# Patient Record
Sex: MALE | Race: WHITE | NOT HISPANIC OR LATINO | URBAN - METROPOLITAN AREA
[De-identification: names, ages, dates, MRNs, and addresses within clinical notes are randomized per-mention and may not be internally consistent; named-entity substitution may affect disease eponyms.]

---

## 2021-08-03 ENCOUNTER — EMERGENCY (EMERGENCY)
Facility: HOSPITAL | Age: 41
LOS: 1 days | Discharge: HOME | End: 2021-08-03
Attending: EMERGENCY MEDICINE | Admitting: EMERGENCY MEDICINE
Payer: COMMERCIAL

## 2021-08-03 VITALS
OXYGEN SATURATION: 97 % | WEIGHT: 169.98 LBS | TEMPERATURE: 98 F | RESPIRATION RATE: 16 BRPM | SYSTOLIC BLOOD PRESSURE: 127 MMHG | DIASTOLIC BLOOD PRESSURE: 87 MMHG | HEART RATE: 70 BPM | HEIGHT: 67 IN

## 2021-08-03 DIAGNOSIS — Z23 ENCOUNTER FOR IMMUNIZATION: ICD-10-CM

## 2021-08-03 DIAGNOSIS — Z29.14 ENCOUNTER FOR PROPHYLACTIC RABIES IMMUNE GLOBIN: ICD-10-CM

## 2021-08-03 DIAGNOSIS — F32.89 OTHER SPECIFIED DEPRESSIVE EPISODES: ICD-10-CM

## 2021-08-03 PROCEDURE — 99284 EMERGENCY DEPT VISIT MOD MDM: CPT

## 2021-08-03 RX ORDER — RABIES VACC, HUMAN DIPLOID/PF 2.5 UNIT
1 VIAL (EA) INTRAMUSCULAR ONCE
Refills: 0 | Status: COMPLETED | OUTPATIENT
Start: 2021-08-03 | End: 2021-08-03

## 2021-08-03 RX ORDER — FLUOXETINE HCL 10 MG
1 CAPSULE ORAL
Qty: 0 | Refills: 0 | DISCHARGE

## 2021-08-03 RX ORDER — HUMAN RABIES VIRUS IMMUNE GLOBULIN 150 [IU]/ML
1550 INJECTION, SOLUTION INTRAMUSCULAR ONCE
Refills: 0 | Status: COMPLETED | OUTPATIENT
Start: 2021-08-03 | End: 2021-08-03

## 2021-08-03 RX ORDER — RABIES VACC, HUMAN DIPLOID/PF 2.5 UNIT
1 VIAL (EA) INTRAMUSCULAR ONCE
Refills: 0 | Status: DISCONTINUED | OUTPATIENT
Start: 2021-08-03 | End: 2021-08-03

## 2021-08-03 RX ADMIN — HUMAN RABIES VIRUS IMMUNE GLOBULIN 1550 UNIT(S): 150 INJECTION, SOLUTION INTRAMUSCULAR at 20:42

## 2021-08-03 RX ADMIN — Medication 1 MILLILITER(S): at 20:41

## 2021-08-03 NOTE — ED PROVIDER NOTE - PATIENT PORTAL LINK FT
You can access the FollowMyHealth Patient Portal offered by [facility] by registering at the following website: [email or web address]. By joining [employer] (formerly [employer]’s FollowMyHealth portal, you will also be able to view your health information using other applications (apps) compatible with our system.

## 2021-08-03 NOTE — ED PROVIDER NOTE - PHYSICAL EXAMINATION
Physical Exam  GEN: Awake, alert, non-toxic appearing,   EYES: full EOMI,  ENT: External inspection normal, normal voice,   HEAD: atraumatic  NECK: FROM neck, supple,   RESP: no tachypnea, no hypoxia, no resp distress,  MSK: rothman x 4

## 2021-08-03 NOTE — ED PROVIDER NOTE - NSFOLLOWUPINSTRUCTIONS_ED_ALL_ED_FT
Follow up with your primary care doctor or a local ER for additional vaccine series  You received the immunoglobulin and first dose of the vaccine TODAY 8/3/2021  Your next series of vaccination is on days 3, 7, and 14  8/6, 8/10, 8/17  You received Rabavert today (the vaccine)  Return immediately for any new or worsening symptoms or any new concerns Follow up with your primary care doctor or a local ER for additional vaccine series  You received the immunoglobulin and first dose of the vaccine TODAY 8/3/2021  Your next series of vaccination is on days 3, 7, and 14  8/6, 8/10, 8/17  You received IMOVAX today (the vaccine)  Return immediately for any new or worsening symptoms or any new concerns

## 2021-08-03 NOTE — ED PROVIDER NOTE - CLINICAL SUMMARY MEDICAL DECISION MAKING FREE TEXT BOX
possible exposure, given high risk of lack of treatment, shared decision w/ pt to proceed w/ vaccine and immunoglobulin at pt's request

## 2021-08-03 NOTE — ED PROVIDER NOTE - OBJECTIVE STATEMENT
40 yom pw waking up in his rental house w/ a bat, noted droppings, does not believe he's bitten.  concerned for rabies exposure.  no other complaints.

## 2021-08-03 NOTE — ED PROVIDER NOTE - CARE PLAN
Principal Discharge DX:	Encounter for prophylactic administration of rabies immune globulin  Secondary Diagnosis:	Need for prophylactic vaccination against rabies

## 2021-08-03 NOTE — ED PROVIDER NOTE - SECONDARY DIAGNOSIS.
Photo Preface (Leave Blank If You Do Not Want): Photographs were obtained today
Detail Level: Zone
Need for prophylactic vaccination against rabies